# Patient Record
Sex: FEMALE | Race: OTHER
[De-identification: names, ages, dates, MRNs, and addresses within clinical notes are randomized per-mention and may not be internally consistent; named-entity substitution may affect disease eponyms.]

---

## 2019-09-16 ENCOUNTER — HOSPITAL ENCOUNTER (EMERGENCY)
Dept: HOSPITAL 84 - D.ER | Age: 24
Discharge: HOME | End: 2019-09-16
Payer: MEDICAID

## 2019-09-16 VITALS — DIASTOLIC BLOOD PRESSURE: 85 MMHG | SYSTOLIC BLOOD PRESSURE: 113 MMHG

## 2019-09-16 VITALS — WEIGHT: 156.33 LBS | HEIGHT: 60 IN | BODY MASS INDEX: 30.69 KG/M2

## 2019-09-16 DIAGNOSIS — K59.00: Primary | ICD-10-CM

## 2019-09-16 DIAGNOSIS — S39.011A: ICD-10-CM

## 2019-09-16 LAB
ALBUMIN SERPL-MCNC: 3.4 G/DL (ref 3.4–5)
ALP SERPL-CCNC: 115 U/L (ref 46–116)
ALT SERPL-CCNC: 24 U/L (ref 10–68)
ANION GAP SERPL CALC-SCNC: 10.9 MMOL/L (ref 8–16)
APPEARANCE UR: (no result)
BACTERIA #/AREA URNS HPF: (no result) /HPF
BASOPHILS NFR BLD AUTO: 0.3 % (ref 0–2)
BILIRUB SERPL-MCNC: 0.41 MG/DL (ref 0.2–1.3)
BILIRUB SERPL-MCNC: NEGATIVE MG/DL
BUN SERPL-MCNC: 11 MG/DL (ref 7–18)
CALCIUM SERPL-MCNC: 8.9 MG/DL (ref 8.5–10.1)
CHLORIDE SERPL-SCNC: 107 MMOL/L (ref 98–107)
CO2 SERPL-SCNC: 25.9 MMOL/L (ref 21–32)
COLOR UR: YELLOW
CREAT SERPL-MCNC: 0.6 MG/DL (ref 0.6–1.3)
EOSINOPHIL NFR BLD: 0.7 % (ref 0–7)
ERYTHROCYTE [DISTWIDTH] IN BLOOD BY AUTOMATED COUNT: 14.6 % (ref 11.5–14.5)
GLOBULIN SER-MCNC: 4.1 G/L
GLUCOSE SERPL-MCNC: 89 MG/DL (ref 74–106)
GLUCOSE SERPL-MCNC: NEGATIVE MG/DL
HCG UR QL: NEGATIVE
HCT VFR BLD CALC: 36.2 % (ref 36–48)
HGB BLD-MCNC: 11.8 G/DL (ref 12–16)
IMM GRANULOCYTES NFR BLD: 0.1 % (ref 0–5)
KETONES UR STRIP-MCNC: NEGATIVE MG/DL
LIPASE SERPL-CCNC: 95 U/L (ref 73–393)
LYMPHOCYTES NFR BLD AUTO: 29.7 % (ref 15–50)
MCH RBC QN AUTO: 26.4 PG (ref 26–34)
MCHC RBC AUTO-ENTMCNC: 32.6 G/DL (ref 31–37)
MCV RBC: 81 FL (ref 80–100)
MONOCYTES NFR BLD: 5.5 % (ref 2–11)
MUCOUS THREADS #/AREA URNS LPF: (no result) /LPF
NEUTROPHILS NFR BLD AUTO: 63.7 % (ref 40–80)
NITRITE UR-MCNC: NEGATIVE MG/ML
OSMOLALITY SERPL CALC.SUM OF ELEC: 276 MOSM/KG (ref 275–300)
PH UR STRIP: 5 [PH] (ref 5–6)
PLATELET # BLD: 247 10X3/UL (ref 130–400)
PMV BLD AUTO: 10.1 FL (ref 7.4–10.4)
POTASSIUM SERPL-SCNC: 3.8 MMOL/L (ref 3.5–5.1)
PROT SERPL-MCNC: 7.5 G/DL (ref 6.4–8.2)
PROT UR-MCNC: NEGATIVE MG/DL
RBC # BLD AUTO: 4.47 10X6/UL (ref 4–5.4)
RBC #/AREA URNS HPF: (no result) /HPF (ref 0–5)
SODIUM SERPL-SCNC: 140 MMOL/L (ref 136–145)
SP GR UR STRIP: 1.01 (ref 1–1.02)
SQUAMOUS #/AREA URNS HPF: (no result) /HPF (ref 0–5)
TROPONIN I SERPL-MCNC: < 0.017 NG/ML (ref 0–0.06)
UROBILINOGEN UR-MCNC: NORMAL MG/DL
WBC # BLD AUTO: 7.1 10X3/UL (ref 4.8–10.8)
WBC #/AREA URNS HPF: (no result) /HPF (ref 0–5)

## 2019-11-06 ENCOUNTER — HOSPITAL ENCOUNTER (EMERGENCY)
Dept: HOSPITAL 84 - D.ER | Age: 24
Discharge: LEFT BEFORE DISCHARGE | End: 2019-11-06
Payer: MEDICAID

## 2019-11-06 VITALS — DIASTOLIC BLOOD PRESSURE: 89 MMHG | SYSTOLIC BLOOD PRESSURE: 129 MMHG

## 2019-11-06 VITALS — HEIGHT: 60 IN | BODY MASS INDEX: 32.07 KG/M2 | WEIGHT: 163.34 LBS

## 2019-11-06 DIAGNOSIS — R10.9: Primary | ICD-10-CM

## 2019-11-06 LAB
ALBUMIN SERPL-MCNC: 3.1 G/DL (ref 3.4–5)
ALP SERPL-CCNC: 101 U/L (ref 46–116)
ALT SERPL-CCNC: 22 U/L (ref 10–68)
AMYLASE SERPL-CCNC: 66 U/L (ref 25–115)
ANION GAP SERPL CALC-SCNC: 11.4 MMOL/L (ref 8–16)
APPEARANCE UR: CLEAR
BASOPHILS NFR BLD AUTO: 0.1 % (ref 0–2)
BILIRUB SERPL-MCNC: 0.24 MG/DL (ref 0.2–1.3)
BILIRUB SERPL-MCNC: NEGATIVE MG/DL
BUN SERPL-MCNC: 8 MG/DL (ref 7–18)
CALCIUM SERPL-MCNC: 8.6 MG/DL (ref 8.5–10.1)
CHLORIDE SERPL-SCNC: 105 MMOL/L (ref 98–107)
CO2 SERPL-SCNC: 26.2 MMOL/L (ref 21–32)
COLOR UR: YELLOW
CREAT SERPL-MCNC: 0.6 MG/DL (ref 0.6–1.3)
EOSINOPHIL NFR BLD: 0.7 % (ref 0–7)
ERYTHROCYTE [DISTWIDTH] IN BLOOD BY AUTOMATED COUNT: 15.6 % (ref 11.5–14.5)
GLOBULIN SER-MCNC: 4.4 G/L
GLUCOSE SERPL-MCNC: 84 MG/DL (ref 74–106)
GLUCOSE SERPL-MCNC: NEGATIVE MG/DL
HCG SERPL-ACNC: 2947 MIU/ML
HCG UR QL: POSITIVE
HCT VFR BLD CALC: 36.1 % (ref 36–48)
HGB BLD-MCNC: 11.5 G/DL (ref 12–16)
IMM GRANULOCYTES NFR BLD: 0.2 % (ref 0–5)
KETONES UR STRIP-MCNC: NEGATIVE MG/DL
LIPASE SERPL-CCNC: 150 U/L (ref 73–393)
LYMPHOCYTES NFR BLD AUTO: 28 % (ref 15–50)
MCH RBC QN AUTO: 27.1 PG (ref 26–34)
MCHC RBC AUTO-ENTMCNC: 31.9 G/DL (ref 31–37)
MCV RBC: 84.9 FL (ref 80–100)
MONOCYTES NFR BLD: 8.6 % (ref 2–11)
NEUTROPHILS NFR BLD AUTO: 62.4 % (ref 40–80)
NITRITE UR-MCNC: NEGATIVE MG/ML
OSMOLALITY SERPL CALC.SUM OF ELEC: 274 MOSM/KG (ref 275–300)
PH UR STRIP: 7 [PH] (ref 5–6)
PLATELET # BLD: 283 10X3/UL (ref 130–400)
PMV BLD AUTO: 9.7 FL (ref 7.4–10.4)
POTASSIUM SERPL-SCNC: 3.6 MMOL/L (ref 3.5–5.1)
PROT SERPL-MCNC: 7.5 G/DL (ref 6.4–8.2)
PROT UR-MCNC: NEGATIVE MG/DL
RBC # BLD AUTO: 4.25 10X6/UL (ref 4–5.4)
SODIUM SERPL-SCNC: 139 MMOL/L (ref 136–145)
SP GR UR STRIP: 1.01 (ref 1–1.02)
UROBILINOGEN UR-MCNC: NORMAL MG/DL
WBC # BLD AUTO: 8.7 10X3/UL (ref 4.8–10.8)

## 2019-11-07 ENCOUNTER — HOSPITAL ENCOUNTER (EMERGENCY)
Dept: HOSPITAL 84 - D.ER | Age: 24
Discharge: HOME | End: 2019-11-07
Payer: MEDICAID

## 2019-11-07 VITALS — DIASTOLIC BLOOD PRESSURE: 76 MMHG | SYSTOLIC BLOOD PRESSURE: 110 MMHG

## 2019-11-07 VITALS — HEIGHT: 60 IN | WEIGHT: 163.34 LBS | BODY MASS INDEX: 32.07 KG/M2

## 2019-11-07 DIAGNOSIS — O26.90: Primary | ICD-10-CM

## 2019-11-16 ENCOUNTER — HOSPITAL ENCOUNTER (EMERGENCY)
Dept: HOSPITAL 84 - D.ER | Age: 24
Discharge: HOME | End: 2019-11-16
Payer: MEDICAID

## 2019-11-16 VITALS — WEIGHT: 160.34 LBS | HEIGHT: 60 IN | BODY MASS INDEX: 31.48 KG/M2

## 2019-11-16 VITALS — DIASTOLIC BLOOD PRESSURE: 82 MMHG | SYSTOLIC BLOOD PRESSURE: 132 MMHG

## 2019-11-16 DIAGNOSIS — O21.9: ICD-10-CM

## 2019-11-16 DIAGNOSIS — Z3A.01: ICD-10-CM

## 2019-11-16 DIAGNOSIS — O23.41: Primary | ICD-10-CM

## 2019-11-16 DIAGNOSIS — N39.0: ICD-10-CM

## 2019-11-16 LAB
ALBUMIN SERPL-MCNC: 3.2 G/DL (ref 3.4–5)
ALP SERPL-CCNC: 99 U/L (ref 46–116)
ALT SERPL-CCNC: 16 U/L (ref 10–68)
AMPHETAMINES UR QL SCN: NEGATIVE QUAL
ANION GAP SERPL CALC-SCNC: 14.5 MMOL/L (ref 8–16)
APPEARANCE UR: CLEAR
BACTERIA #/AREA URNS HPF: (no result) /HPF
BARBITURATES UR QL SCN: NEGATIVE QUAL
BASOPHILS NFR BLD AUTO: 0.3 % (ref 0–2)
BENZODIAZ UR QL SCN: NEGATIVE QUAL
BILIRUB SERPL-MCNC: 0.45 MG/DL (ref 0.2–1.3)
BILIRUB SERPL-MCNC: NEGATIVE MG/DL
BUN SERPL-MCNC: 9 MG/DL (ref 7–18)
BZE UR QL SCN: NEGATIVE QUAL
CALCIUM SERPL-MCNC: 8.9 MG/DL (ref 8.5–10.1)
CANNABINOIDS UR QL SCN: NEGATIVE QUAL
CHLORIDE SERPL-SCNC: 105 MMOL/L (ref 98–107)
CO2 SERPL-SCNC: 24.1 MMOL/L (ref 21–32)
COLOR UR: YELLOW
CREAT SERPL-MCNC: 0.7 MG/DL (ref 0.6–1.3)
EOSINOPHIL NFR BLD: 0.8 % (ref 0–7)
ERYTHROCYTE [DISTWIDTH] IN BLOOD BY AUTOMATED COUNT: 15.1 % (ref 11.5–14.5)
GLOBULIN SER-MCNC: 4.6 G/L
GLUCOSE SERPL-MCNC: 87 MG/DL (ref 74–106)
GLUCOSE SERPL-MCNC: NEGATIVE MG/DL
HCG SERPL-ACNC: (no result) MIU/ML
HCT VFR BLD CALC: 37.9 % (ref 36–48)
HGB BLD-MCNC: 12 G/DL (ref 12–16)
IMM GRANULOCYTES NFR BLD: 0.3 % (ref 0–5)
KETONES UR STRIP-MCNC: (no result) MG/DL
LYMPHOCYTES NFR BLD AUTO: 21.2 % (ref 15–50)
MCH RBC QN AUTO: 27.1 PG (ref 26–34)
MCHC RBC AUTO-ENTMCNC: 31.7 G/DL (ref 31–37)
MCV RBC: 85.7 FL (ref 80–100)
MONOCYTES NFR BLD: 6.8 % (ref 2–11)
MUCOUS THREADS #/AREA URNS LPF: (no result) /LPF
NEUTROPHILS NFR BLD AUTO: 70.6 % (ref 40–80)
NITRITE UR-MCNC: POSITIVE MG/ML
OPIATES UR QL SCN: NEGATIVE QUAL
OSMOLALITY SERPL CALC.SUM OF ELEC: 276 MOSM/KG (ref 275–300)
PCP UR QL SCN: NEGATIVE QUAL
PH UR STRIP: 6 [PH] (ref 5–6)
PLATELET # BLD: 245 10X3/UL (ref 130–400)
PMV BLD AUTO: 9.9 FL (ref 7.4–10.4)
POTASSIUM SERPL-SCNC: 3.6 MMOL/L (ref 3.5–5.1)
PROT SERPL-MCNC: 7.8 G/DL (ref 6.4–8.2)
PROT UR-MCNC: NEGATIVE MG/DL
RBC # BLD AUTO: 4.42 10X6/UL (ref 4–5.4)
RBC #/AREA URNS HPF: (no result) /HPF (ref 0–5)
SODIUM SERPL-SCNC: 140 MMOL/L (ref 136–145)
SP GR UR STRIP: 1.01 (ref 1–1.02)
UROBILINOGEN UR-MCNC: NORMAL MG/DL
WBC # BLD AUTO: 7.8 10X3/UL (ref 4.8–10.8)

## 2019-11-18 ENCOUNTER — HOSPITAL ENCOUNTER (OUTPATIENT)
Dept: HOSPITAL 84 - D.LDO | Age: 24
LOS: 1 days | Discharge: HOME | End: 2019-11-19
Attending: OBSTETRICS & GYNECOLOGY
Payer: MEDICAID

## 2019-11-18 VITALS — SYSTOLIC BLOOD PRESSURE: 124 MMHG | DIASTOLIC BLOOD PRESSURE: 68 MMHG

## 2019-11-18 VITALS — BODY MASS INDEX: 31.3 KG/M2

## 2019-11-18 DIAGNOSIS — Z3A.08: ICD-10-CM

## 2019-11-18 DIAGNOSIS — R11.2: ICD-10-CM

## 2019-11-18 DIAGNOSIS — M54.9: ICD-10-CM

## 2019-11-18 DIAGNOSIS — O26.891: Primary | ICD-10-CM

## 2019-11-18 LAB
ALBUMIN SERPL-MCNC: 3.4 G/DL (ref 3.4–5)
ALP SERPL-CCNC: 99 U/L (ref 46–116)
ALT SERPL-CCNC: 16 U/L (ref 10–68)
ANION GAP SERPL CALC-SCNC: 15.4 MMOL/L (ref 8–16)
APPEARANCE UR: CLEAR
BASOPHILS NFR BLD AUTO: 0.4 % (ref 0–2)
BILIRUB SERPL-MCNC: 0.39 MG/DL (ref 0.2–1.3)
BILIRUB SERPL-MCNC: NEGATIVE MG/DL
BUN SERPL-MCNC: 7 MG/DL (ref 7–18)
CALCIUM SERPL-MCNC: 9.1 MG/DL (ref 8.5–10.1)
CHLORIDE SERPL-SCNC: 104 MMOL/L (ref 98–107)
CO2 SERPL-SCNC: 22.2 MMOL/L (ref 21–32)
COLOR UR: YELLOW
CREAT SERPL-MCNC: 0.6 MG/DL (ref 0.6–1.3)
EOSINOPHIL NFR BLD: 0.4 % (ref 0–7)
ERYTHROCYTE [DISTWIDTH] IN BLOOD BY AUTOMATED COUNT: 15 % (ref 11.5–14.5)
GLOBULIN SER-MCNC: 4.1 G/L
GLUCOSE SERPL-MCNC: 81 MG/DL (ref 74–106)
GLUCOSE SERPL-MCNC: NEGATIVE MG/DL
HCT VFR BLD CALC: 37.9 % (ref 36–48)
HGB BLD-MCNC: 12.2 G/DL (ref 12–16)
IMM GRANULOCYTES NFR BLD: 0.1 % (ref 0–5)
KETONES UR STRIP-MCNC: (no result) MG/DL
LYMPHOCYTES NFR BLD AUTO: 22.3 % (ref 15–50)
MCH RBC QN AUTO: 27.3 PG (ref 26–34)
MCHC RBC AUTO-ENTMCNC: 32.2 G/DL (ref 31–37)
MCV RBC: 84.8 FL (ref 80–100)
MONOCYTES NFR BLD: 8.1 % (ref 2–11)
NEUTROPHILS NFR BLD AUTO: 68.7 % (ref 40–80)
NITRITE UR-MCNC: NEGATIVE MG/ML
OSMOLALITY SERPL CALC.SUM OF ELEC: 272 MOSM/KG (ref 275–300)
PH UR STRIP: 5 [PH] (ref 5–6)
PLATELET # BLD: 256 10X3/UL (ref 130–400)
PMV BLD AUTO: 10.2 FL (ref 7.4–10.4)
POTASSIUM SERPL-SCNC: 3.6 MMOL/L (ref 3.5–5.1)
PROT SERPL-MCNC: 7.5 G/DL (ref 6.4–8.2)
PROT UR-MCNC: NEGATIVE MG/DL
RBC # BLD AUTO: 4.47 10X6/UL (ref 4–5.4)
SODIUM SERPL-SCNC: 138 MMOL/L (ref 136–145)
SP GR UR STRIP: 1.01 (ref 1–1.02)
UROBILINOGEN UR-MCNC: 1 MG/DL
WBC # BLD AUTO: 7.2 10X3/UL (ref 4.8–10.8)

## 2019-11-18 NOTE — NUR
PT TRANSFERRED FROM LABOR AND DELIVERY BY WHEELCHAIR. BEDSIDE REPORT RECEIVED
FROM GISELL LARSEN RN.  PT IS AWAKE AND ALERT AT THIS TIME,  SHE RATES HER PAIN AT
0/10 AND DENIES NAUSEA.  IV INFUSING TO RIGHT HAND PER ORDERS.  PT IS NPO PER
DR HENRY ORDERS AND STATES HER UNDERSTANDING AS TO WHY THIS IS.  CALL LIGHT
IN REACH WITH SIDE RAILS UP X 2.

## 2019-11-18 NOTE — NUR
PT RESTING WITH HER EYES CLOSED AT THIS TIME.  SHE DOES WAKE UP TO TALK.
REGLAN IV GIVEN AS WELL AS D5 1/2 NS AT 125CC/HR.  EXPLAINED TO PT THAT I
WOULD BE BACK A LITTLE LATER TO DO HER ASSESSMENT.

## 2019-11-18 NOTE — NUR
PT CALLS OUT WITH REQUEST FOR PHENERGAN,  STATES THAT SHE IS VERY NAUSEATED AT
THIS TIME.  PHENERGAN 25MG DILUTED IN 9ML NS GIVEN SLOW IV PUSH OVER
5MINUTES, FLUSHED EASILY AFTERWARDS WITH 5ML NS.  LIGHTS TURNED DOWN PER PT
REQUEST.

## 2019-11-18 NOTE — NUR
PT IS RESTING WITH HER EYES CLOSED IN HER DARK ROOM.  SHE IS VERY DROWSEY. SHE
WILL ANSWER QUESTIONS BUT REALLY DOESN'T WANT TO BE BOTHERED.  HEART SOUNDS
GOOD, LUNGS CLEAR, BOWEL SOUNDS HEARD. PT GETS UP TO GO TO THE BR
INDEPENDENTLY.  SHE HAS NOT VOMITED THIS SHIFT.  SHE STATES SHE CANNOT
REMEMBER THE LAST TIME SHE VOMITED.  SHE CONT. TO BE NPO.  NO C/O NAUSEA AT
THIS TIME.  REGLAN WAS GIVEN IV AT 1930. FOLLOWING.

## 2019-11-19 VITALS — SYSTOLIC BLOOD PRESSURE: 90 MMHG | DIASTOLIC BLOOD PRESSURE: 46 MMHG

## 2019-11-19 VITALS — SYSTOLIC BLOOD PRESSURE: 120 MMHG | DIASTOLIC BLOOD PRESSURE: 64 MMHG

## 2019-11-19 VITALS — DIASTOLIC BLOOD PRESSURE: 73 MMHG | SYSTOLIC BLOOD PRESSURE: 103 MMHG

## 2019-11-19 NOTE — NUR
PT RESTING WITH EYES CLOSED, AROUSES TO SOFT VERBAL STIMULATION, VS OBTAINED,
ADM REGLAN SIVP PER MD ORDERS, SEE EMAR, PT DENIES ANY NAUSEA/VOMITING OR PAIN
AT THIS TIME, PT STATES "I'M READY TO GO HOME", PT DENIES ANY NEEDS

## 2019-11-19 NOTE — NUR
AM ASSESSMENT COMPLETE, SEE FLOWSHEET. VS OBTAINED AND STABLE, SEE FLOWSHEET.
PATIENT RATES PAIN AT 0 OUT OF 10 AND DENIES ANY NAUSEA OR VOMITING. 400CC
CLEAR YELLOW URINE NOTED IN HAT AND EMPTIED. PATIENT DENIES ANY ISSUES WITH
VOIDING. RIGHT HAND IV PATENT AND INFUSING. PT DENIES PAIN AT IV SITE.
BREAKFAST TRAY NOTED AT BEDSIDE. PT DENIES ANY NEEDS AT THIS TIME. SRUP X2,
CALL LIGHT AND PHONE WITHIN REACH.

## 2019-11-19 NOTE — NUR
IV CATH REMOVED -CATH TIP INTACT. PRESSURE HELD AND BANDAIDE APPLIED. SCRIPT
FOR REGLAN 10MG PO [id number] CALLED INTO [facility] ON [address].

## 2019-11-19 NOTE — NUR
ROUNDS COMPLETED. DENIES NAUSEA AND VOMITING AND PAIN. FRESH ICE WATER
PROVIDED. SRUPX2. CALL LIGHT AND PHONE WITHIN REACH.

## 2019-11-19 NOTE — NUR
DISCHARGE INST VERBAL AND WRITTEN GIVEN. SEE SIGNED INST SHEET. PT STATES SHE
WILL WALK OFF UNIT WITH FAMILY- DENIES WANTING WHEELCHAIR. STATES THAT SHE
WILL FINISH HER SUPPER AND THEN BE READY TO LEAVE UNIT.

## 2019-11-19 NOTE — NUR
ROUNDS COMPLETED. PT RESTING WITH EYES CLOSED. RESPIRATIONS EVEN AND
UNLABORED. SRUPX2 CALL LIGHT AND PHONE WITHIN REACH.

## 2019-11-19 NOTE — NUR
PT STILL RESTING WITH HER EYES CLOSED RESTING ON HER ABD.  SHE WILL WAKE UP
WHEN ASKED QUESTIONS.  SHE HAS NOT VOMITED SINCE THE BEGINNING OF THIS SHIFT
THAT RN HAS SEEN AND PT HAS NOT STATES SHE HAS VOMITED.  PT HAS NO NEEDS OR
WANTS AT THIS TIME.

## 2019-11-19 NOTE — NUR
REPORT OF PT EATING AND NO EMESIS AND THAT SHE IS READY TO GO HOME TO DR HENRY. STATES SHE WANTS TO COME AND SEE HER BEFORE DISCHARGE. INFORMED PT OF
THIS.

## 2019-11-26 ENCOUNTER — HOSPITAL ENCOUNTER (OUTPATIENT)
Dept: HOSPITAL 84 - D.LDO | Age: 24
Discharge: LEFT BEFORE DISCHARGE | End: 2019-11-26
Attending: OBSTETRICS & GYNECOLOGY
Payer: MEDICAID

## 2019-11-26 VITALS — BODY MASS INDEX: 31.3 KG/M2

## 2019-11-26 DIAGNOSIS — F41.0: ICD-10-CM

## 2019-11-26 DIAGNOSIS — O26.899: Primary | ICD-10-CM

## 2019-11-26 LAB
ALBUMIN SERPL-MCNC: 3.5 G/DL (ref 3.4–5)
ALP SERPL-CCNC: 104 U/L (ref 46–116)
ALT SERPL-CCNC: 18 U/L (ref 10–68)
ANION GAP SERPL CALC-SCNC: 14.4 MMOL/L (ref 8–16)
APPEARANCE UR: (no result)
BACTERIA #/AREA URNS HPF: (no result) /HPF
BASOPHILS NFR BLD AUTO: 0.2 % (ref 0–2)
BILIRUB SERPL-MCNC: 0.46 MG/DL (ref 0.2–1.3)
BILIRUB SERPL-MCNC: NEGATIVE MG/DL
BUN SERPL-MCNC: 9 MG/DL (ref 7–18)
CALCIUM SERPL-MCNC: 9.5 MG/DL (ref 8.5–10.1)
CHLORIDE SERPL-SCNC: 104 MMOL/L (ref 98–107)
CO2 SERPL-SCNC: 23.4 MMOL/L (ref 21–32)
COLOR UR: YELLOW
CREAT SERPL-MCNC: 0.6 MG/DL (ref 0.6–1.3)
EOSINOPHIL NFR BLD: 0.5 % (ref 0–7)
ERYTHROCYTE [DISTWIDTH] IN BLOOD BY AUTOMATED COUNT: 14.9 % (ref 11.5–14.5)
GLOBULIN SER-MCNC: 4.4 G/L
GLUCOSE SERPL-MCNC: 81 MG/DL (ref 74–106)
GLUCOSE SERPL-MCNC: NEGATIVE MG/DL
HCT VFR BLD CALC: 38.2 % (ref 36–48)
HGB BLD-MCNC: 12.3 G/DL (ref 12–16)
IMM GRANULOCYTES NFR BLD: 0.2 % (ref 0–5)
KETONES UR STRIP-MCNC: (no result) MG/DL
LYMPHOCYTES NFR BLD AUTO: 23 % (ref 15–50)
MCH RBC QN AUTO: 27.3 PG (ref 26–34)
MCHC RBC AUTO-ENTMCNC: 32.2 G/DL (ref 31–37)
MCV RBC: 84.7 FL (ref 80–100)
MONOCYTES NFR BLD: 5.2 % (ref 2–11)
MUCOUS THREADS #/AREA URNS LPF: (no result) /LPF
NEUTROPHILS NFR BLD AUTO: 70.9 % (ref 40–80)
NITRITE UR-MCNC: NEGATIVE MG/ML
OSMOLALITY SERPL CALC.SUM OF ELEC: 273 MOSM/KG (ref 275–300)
PH UR STRIP: 5 [PH] (ref 5–6)
PLATELET # BLD: 296 10X3/UL (ref 130–400)
PMV BLD AUTO: 10.3 FL (ref 7.4–10.4)
POTASSIUM SERPL-SCNC: 3.8 MMOL/L (ref 3.5–5.1)
PROT SERPL-MCNC: 7.9 G/DL (ref 6.4–8.2)
PROT UR-MCNC: (no result) MG/DL
RBC # BLD AUTO: 4.51 10X6/UL (ref 4–5.4)
RBC #/AREA URNS HPF: (no result) /HPF (ref 0–5)
SODIUM SERPL-SCNC: 138 MMOL/L (ref 136–145)
SP GR UR STRIP: 1.01 (ref 1–1.02)
SQUAMOUS #/AREA URNS HPF: (no result) /HPF (ref 0–5)
UROBILINOGEN UR-MCNC: 4 MG/DL
WBC # BLD AUTO: 9.3 10X3/UL (ref 4.8–10.8)
WBC #/AREA URNS HPF: (no result) /HPF

## 2019-11-26 NOTE — NUR
ROUNDED ON ROOMON THIS OUTPATIENT.  PT IS RESTING IN BED WATCHING TV.  HER VS
WERE 98.1, 106/63, 87,17, 99% O2.  HEART SOUNDS CLEAR, LUNGS CLEAR, BOWEL
SOUNDS IN ALL QUADS.  PT IS UP AD ROBERT IN HER ROOM.  PT STATES SHE HAD A BM
TODAY.  IV IS INFUSING A BANANA BAG AT THIS TIME.  PT STATES SHE HAS NO
NAUSEA, NO VOMITING, AND NO ABD PAIN AT THIS TIME.  SHE REFUSED HER PHENERGAN
THAT WAS SCHEDULED AT THIS TIME.  SHE DID TAKE HER REGLAN IV.

## 2020-01-23 ENCOUNTER — HOSPITAL ENCOUNTER (EMERGENCY)
Dept: HOSPITAL 84 - D.ER | Age: 25
Discharge: HOME | End: 2020-01-23
Payer: MEDICAID

## 2020-01-23 VITALS — SYSTOLIC BLOOD PRESSURE: 116 MMHG | DIASTOLIC BLOOD PRESSURE: 72 MMHG

## 2020-01-23 VITALS
WEIGHT: 143.3 LBS | HEIGHT: 60 IN | HEIGHT: 60 IN | BODY MASS INDEX: 28.13 KG/M2 | WEIGHT: 143.3 LBS | BODY MASS INDEX: 28.13 KG/M2

## 2020-01-23 DIAGNOSIS — R10.2: ICD-10-CM

## 2020-01-23 DIAGNOSIS — Z3A.16: ICD-10-CM

## 2020-01-23 DIAGNOSIS — O26.892: Primary | ICD-10-CM

## 2020-01-23 LAB
APPEARANCE UR: (no result)
BACTERIA #/AREA URNS HPF: (no result) /HPF
BASOPHILS NFR BLD AUTO: 0.1 % (ref 0–2)
BILIRUB SERPL-MCNC: NEGATIVE MG/DL
COLOR UR: YELLOW
EOSINOPHIL NFR BLD: 0.1 % (ref 0–7)
ERYTHROCYTE [DISTWIDTH] IN BLOOD BY AUTOMATED COUNT: 14.6 % (ref 11.5–14.5)
GLUCOSE SERPL-MCNC: NEGATIVE MG/DL
HCT VFR BLD CALC: 38.1 % (ref 36–48)
HGB BLD-MCNC: 12.5 G/DL (ref 12–16)
IMM GRANULOCYTES NFR BLD: 0.2 % (ref 0–5)
KETONES UR STRIP-MCNC: (no result) MG/DL
LYMPHOCYTES NFR BLD AUTO: 14.2 % (ref 15–50)
MCH RBC QN AUTO: 27.7 PG (ref 26–34)
MCHC RBC AUTO-ENTMCNC: 32.8 G/DL (ref 31–37)
MCV RBC: 84.3 FL (ref 80–100)
MONOCYTES NFR BLD: 4 % (ref 2–11)
NEUTROPHILS NFR BLD AUTO: 81.4 % (ref 40–80)
NITRITE UR-MCNC: NEGATIVE MG/ML
PH UR STRIP: 6 [PH] (ref 5–6)
PLATELET # BLD: 254 10X3/UL (ref 130–400)
PMV BLD AUTO: 10.1 FL (ref 7.4–10.4)
PROT UR-MCNC: NEGATIVE MG/DL
RBC # BLD AUTO: 4.52 10X6/UL (ref 4–5.4)
RBC #/AREA URNS HPF: (no result) /HPF (ref 0–5)
SP GR UR STRIP: 1.01 (ref 1–1.02)
SQUAMOUS #/AREA URNS HPF: >50 /HPF (ref 0–5)
UROBILINOGEN UR-MCNC: 4 MG/DL
WBC # BLD AUTO: 8.8 10X3/UL (ref 4.8–10.8)
WBC #/AREA URNS HPF: (no result) /HPF

## 2020-02-22 ENCOUNTER — HOSPITAL ENCOUNTER (OUTPATIENT)
Dept: HOSPITAL 84 - D.LDO | Age: 25
Discharge: HOME | End: 2020-02-22
Attending: OBSTETRICS & GYNECOLOGY
Payer: MEDICAID

## 2020-02-22 VITALS — BODY MASS INDEX: 27.9 KG/M2

## 2020-02-22 DIAGNOSIS — O26.892: Primary | ICD-10-CM

## 2020-02-22 DIAGNOSIS — R10.9: ICD-10-CM

## 2020-02-22 DIAGNOSIS — Z3A.19: ICD-10-CM

## 2020-02-22 LAB
APPEARANCE UR: CLEAR
BILIRUB SERPL-MCNC: NEGATIVE MG/DL
COLOR UR: YELLOW
GLUCOSE SERPL-MCNC: NEGATIVE MG/DL
KETONES UR STRIP-MCNC: NEGATIVE MG/DL
NITRITE UR-MCNC: NEGATIVE MG/ML
PH UR STRIP: 6 [PH] (ref 5–6)
PROT UR-MCNC: NEGATIVE MG/DL
SP GR UR STRIP: 1.01 (ref 1–1.02)
UROBILINOGEN UR-MCNC: NORMAL MG/DL

## 2020-03-18 ENCOUNTER — HOSPITAL ENCOUNTER (EMERGENCY)
Dept: HOSPITAL 84 - D.ER | Age: 25
Discharge: HOME | End: 2020-03-18
Payer: MEDICAID

## 2020-03-18 VITALS
BODY MASS INDEX: 28.73 KG/M2 | WEIGHT: 146.31 LBS | BODY MASS INDEX: 28.73 KG/M2 | WEIGHT: 146.31 LBS | HEIGHT: 60 IN | HEIGHT: 60 IN

## 2020-03-18 VITALS — DIASTOLIC BLOOD PRESSURE: 71 MMHG | SYSTOLIC BLOOD PRESSURE: 129 MMHG

## 2020-03-18 DIAGNOSIS — Z3A.23: ICD-10-CM

## 2020-03-18 DIAGNOSIS — R42: ICD-10-CM

## 2020-03-18 DIAGNOSIS — O21.2: Primary | ICD-10-CM

## 2020-03-18 LAB
ALBUMIN SERPL-MCNC: 3 G/DL (ref 3.4–5)
ALP SERPL-CCNC: 136 U/L (ref 30–120)
ALT SERPL-CCNC: 12 U/L (ref 10–68)
ANION GAP SERPL CALC-SCNC: 12.7 MMOL/L (ref 8–16)
BACTERIA #/AREA URNS HPF: (no result) /HPF
BASOPHILS NFR BLD AUTO: 0.2 % (ref 0–2)
BILIRUB SERPL-MCNC: 0.33 MG/DL (ref 0.2–1.3)
BILIRUB SERPL-MCNC: NEGATIVE MG/DL
BUN SERPL-MCNC: 10 MG/DL (ref 7–18)
CALCIUM SERPL-MCNC: 8.9 MG/DL (ref 8.5–10.1)
CHLORIDE SERPL-SCNC: 101 MMOL/L (ref 98–107)
CO2 SERPL-SCNC: 26.2 MMOL/L (ref 21–32)
CREAT SERPL-MCNC: 0.5 MG/DL (ref 0.6–1.3)
EOSINOPHIL NFR BLD: 0.4 % (ref 0–7)
ERYTHROCYTE [DISTWIDTH] IN BLOOD BY AUTOMATED COUNT: 14.9 % (ref 11.5–14.5)
GLOBULIN SER-MCNC: 4.6 G/L
GLUCOSE SERPL-MCNC: 69 MG/DL (ref 74–106)
GLUCOSE SERPL-MCNC: NEGATIVE MG/DL
HCT VFR BLD CALC: 35.7 % (ref 36–48)
HGB BLD-MCNC: 11.3 G/DL (ref 12–16)
IMM GRANULOCYTES NFR BLD: 0.5 % (ref 0–5)
KETONES UR STRIP-MCNC: NEGATIVE MG/DL
LYMPHOCYTES NFR BLD AUTO: 19.1 % (ref 15–50)
MCH RBC QN AUTO: 27.6 PG (ref 26–34)
MCHC RBC AUTO-ENTMCNC: 31.7 G/DL (ref 31–37)
MCV RBC: 87.1 FL (ref 80–100)
MONOCYTES NFR BLD: 6.3 % (ref 2–11)
NEUTROPHILS NFR BLD AUTO: 73.5 % (ref 40–80)
NITRITE UR-MCNC: NEGATIVE MG/ML
OSMOLALITY SERPL CALC.SUM OF ELEC: 268 MOSM/KG (ref 275–300)
PH UR STRIP: 7 [PH] (ref 5–6)
PLATELET # BLD: 283 10X3/UL (ref 130–400)
PMV BLD AUTO: 9.5 FL (ref 7.4–10.4)
POTASSIUM SERPL-SCNC: 3.9 MMOL/L (ref 3.5–5.1)
PROT SERPL-MCNC: 7.6 G/DL (ref 6.4–8.2)
RBC # BLD AUTO: 4.1 10X6/UL (ref 4–5.4)
RBC #/AREA URNS HPF: (no result) /HPF (ref 0–5)
SODIUM SERPL-SCNC: 136 MMOL/L (ref 136–145)
SP GR UR STRIP: 1.01 (ref 1–1.02)
SQUAMOUS #/AREA URNS HPF: (no result) /HPF (ref 0–5)
UROBILINOGEN UR-MCNC: 1 MG/DL
WBC # BLD AUTO: 12.2 10X3/UL (ref 4.8–10.8)
WBC #/AREA URNS HPF: (no result) /HPF

## 2020-05-04 ENCOUNTER — HOSPITAL ENCOUNTER (OUTPATIENT)
Dept: HOSPITAL 84 - D.LDO | Age: 25
Discharge: HOME | End: 2020-05-04
Attending: OBSTETRICS & GYNECOLOGY
Payer: MEDICAID

## 2020-05-04 VITALS — BODY MASS INDEX: 28.5 KG/M2

## 2020-05-04 DIAGNOSIS — O36.8190: Primary | ICD-10-CM

## 2020-05-04 DIAGNOSIS — Z3A.00: ICD-10-CM

## 2020-06-04 ENCOUNTER — HOSPITAL ENCOUNTER (OUTPATIENT)
Dept: HOSPITAL 84 - D.LD | Age: 25
Setting detail: OBSERVATION
LOS: 1 days | Discharge: HOME | End: 2020-06-05
Attending: OBSTETRICS & GYNECOLOGY | Admitting: OBSTETRICS & GYNECOLOGY
Payer: MEDICAID

## 2020-06-04 VITALS — BODY MASS INDEX: 28.5 KG/M2

## 2020-06-04 DIAGNOSIS — Z3A.34: ICD-10-CM

## 2020-06-04 DIAGNOSIS — R10.9: ICD-10-CM

## 2020-06-04 DIAGNOSIS — O26.893: Primary | ICD-10-CM

## 2020-06-23 ENCOUNTER — HOSPITAL ENCOUNTER (OUTPATIENT)
Dept: HOSPITAL 84 - D.LDO | Age: 25
Discharge: HOME | End: 2020-06-23
Attending: OBSTETRICS & GYNECOLOGY
Payer: MEDICAID

## 2020-06-23 VITALS — BODY MASS INDEX: 28.5 KG/M2

## 2020-06-23 DIAGNOSIS — O26.893: Primary | ICD-10-CM

## 2020-06-23 DIAGNOSIS — R10.30: ICD-10-CM

## 2020-06-23 DIAGNOSIS — M54.9: ICD-10-CM

## 2020-06-23 DIAGNOSIS — Z3A.37: ICD-10-CM

## 2020-06-23 LAB
BACTERIA #/AREA URNS HPF: (no result) /HPF
BILIRUB SERPL-MCNC: NEGATIVE MG/DL
GLUCOSE SERPL-MCNC: NEGATIVE MG/DL
KETONES UR STRIP-MCNC: NEGATIVE MG/DL
NITRITE UR-MCNC: NEGATIVE MG/ML
PH UR STRIP: 7 [PH] (ref 5–6)
RBC #/AREA URNS HPF: (no result) /HPF (ref 0–5)
SP GR UR STRIP: 1.01 (ref 1–1.02)
SQUAMOUS #/AREA URNS HPF: (no result) /HPF (ref 0–5)
UROBILINOGEN UR-MCNC: NORMAL MG/DL
WBC #/AREA URNS HPF: (no result) /HPF

## 2020-07-01 ENCOUNTER — HOSPITAL ENCOUNTER (OUTPATIENT)
Dept: HOSPITAL 84 - D.LDO | Age: 25
Discharge: HOME | End: 2020-07-01
Attending: OBSTETRICS & GYNECOLOGY
Payer: MEDICAID

## 2020-07-01 VITALS — BODY MASS INDEX: 28.5 KG/M2

## 2020-07-01 DIAGNOSIS — O26.893: Primary | ICD-10-CM

## 2020-07-01 DIAGNOSIS — Z3A.38: ICD-10-CM

## 2020-07-01 DIAGNOSIS — N85.8: ICD-10-CM

## 2020-07-06 ENCOUNTER — HOSPITAL ENCOUNTER (INPATIENT)
Dept: HOSPITAL 84 - D.LDO | Age: 25
LOS: 1 days | Discharge: HOME | End: 2020-07-07
Attending: OBSTETRICS & GYNECOLOGY | Admitting: OBSTETRICS & GYNECOLOGY
Payer: MEDICAID

## 2020-07-06 VITALS — SYSTOLIC BLOOD PRESSURE: 116 MMHG | DIASTOLIC BLOOD PRESSURE: 78 MMHG

## 2020-07-06 VITALS — WEIGHT: 176.37 LBS | BODY MASS INDEX: 34.63 KG/M2 | HEIGHT: 60 IN | BODY MASS INDEX: 34.63 KG/M2

## 2020-07-06 VITALS — SYSTOLIC BLOOD PRESSURE: 115 MMHG | DIASTOLIC BLOOD PRESSURE: 69 MMHG

## 2020-07-06 VITALS — SYSTOLIC BLOOD PRESSURE: 99 MMHG | DIASTOLIC BLOOD PRESSURE: 70 MMHG

## 2020-07-06 DIAGNOSIS — Z3A.39: ICD-10-CM

## 2020-07-06 LAB
AMPHETAMINES UR QL SCN: NEGATIVE QUAL
BARBITURATES UR QL SCN: NEGATIVE QUAL
BENZODIAZ UR QL SCN: NEGATIVE QUAL
BILIRUB SERPL-MCNC: NEGATIVE MG/DL
BZE UR QL SCN: NEGATIVE QUAL
CANNABINOIDS UR QL SCN: NEGATIVE QUAL
ERYTHROCYTE [DISTWIDTH] IN BLOOD BY AUTOMATED COUNT: 17.1 % (ref 11.5–14.5)
GLUCOSE SERPL-MCNC: NEGATIVE MG/DL
HCT VFR BLD CALC: 32.3 % (ref 36–48)
HGB BLD-MCNC: 9.5 G/DL (ref 12–16)
KETONES UR STRIP-MCNC: NEGATIVE MG/DL
MCH RBC QN AUTO: 22.9 PG (ref 26–34)
MCHC RBC AUTO-ENTMCNC: 29.4 G/DL (ref 31–37)
MCV RBC: 77.8 FL (ref 80–100)
NITRITE UR-MCNC: NEGATIVE MG/ML
OPIATES UR QL SCN: NEGATIVE QUAL
PCP UR QL SCN: NEGATIVE QUAL
PH UR STRIP: 7 [PH] (ref 5–6)
PLATELET # BLD: 278 10X3/UL (ref 130–400)
PMV BLD AUTO: 10.2 FL (ref 7.4–10.4)
RBC # BLD AUTO: 4.15 10X6/UL (ref 4–5.4)
SP GR UR STRIP: 1.01 (ref 1–1.02)
UROBILINOGEN UR-MCNC: NORMAL MG/DL
WBC # BLD AUTO: 13.8 10X3/UL (ref 4.8–10.8)

## 2020-07-06 NOTE — NUR
PT SITTING UP IN BED. CONSUMING FOOD BROUGHT IN BY FAMILY. STATES HEADACHE
RELIEVED BY TORADOL. STATES SHOWERED AND VOIDED. DENIES HEAVY BLEEDING/SOAKING
PAD IN AN HOUR. STATES PASSED ONE SMALL CLOT. DENIES NEEDS OR C/O.

## 2020-07-06 NOTE — NUR
PT LYING SUPINE IN BED. EYES CLOSED. RESP NON-LABORED. PT NOT DISTURBED TO
ALLOW FOR REST. SR UPX  2. CALL LIGHT IN REACH.

## 2020-07-06 NOTE — NUR
ASSESSMENT PER FLOW SHEET, VS OBTAINED, SALINE LOCK TO LEFT AC INTACT WITH NO
REDNESS OR EDEMA, FF, ML, U/1, PT REPORTS LITE BLEEDING WITH NO CLOTS, PT
REPORTS FLATUS, NO BM AND VOIDING WITH NO DIFFICULTY, PT REPORTS ALREADY
TAKING A SHOWER, DENIES PAIN, REQUESTED AND SERVED LEMON LIME SODA AND CUP OF
ICE, DENIES FURTHER NEEDS

## 2020-07-06 NOTE — NUR
PT C/O ABDOMINAL CRAMPING OF "8" ON 0-10 PAIN SCALE. TYLENOL 1000 MG GIVEN PO
AS ORDERED. PT ALSO VOIDED 450 ML OF BLOOD-TINGED URINE IN SPECIPAN-VOID
NUMBER 3. PT DENIES HEAVY BLEEDING OR PASSING CLOTS.

## 2020-07-06 NOTE — NUR
PT ON CALL LIGHT, REPORTS PASSING A CLOT, CLOT NOTED TO BE ABOUT QUARTER SIZE
IN THE COMMODE, PT BACK IN BED, FF, ML, U/1, LITE BLEEDING NOTED, PT DENIES
FURTHER NEEDS, FOB FEEDING INFANT AT THIS TIME

## 2020-07-06 NOTE — NUR
PT C/O H/A OF "8" ON 0-10 PAIN SCALE. TORADOL 10 MG GIVEN PO AS ORDERED.
INSTRUCTED ON MED. VERBALIZES UNDERSTANDING.

## 2020-07-06 NOTE — NUR
PT AROUSES TO OPENING OF DOOR, C/O CRAMPING, ADM TORADOL PO PER MD ORDERS, SEE
EMAR, PT DENIES FURTHER NEEDS, INFANT IN OPEN CRIB CART AND FOB AT BEDSIDE

## 2020-07-07 LAB
BASOPHILS NFR BLD AUTO: 0.2 % (ref 0–2)
EOSINOPHIL NFR BLD: 0.3 % (ref 0–7)
ERYTHROCYTE [DISTWIDTH] IN BLOOD BY AUTOMATED COUNT: 17.3 % (ref 11.5–14.5)
HCT VFR BLD CALC: 29.3 % (ref 36–48)
HGB BLD-MCNC: 8.5 G/DL (ref 12–16)
IMM GRANULOCYTES NFR BLD: 0.5 % (ref 0–5)
LYMPHOCYTES NFR BLD AUTO: 20.8 % (ref 15–50)
MCH RBC QN AUTO: 22.4 PG (ref 26–34)
MCHC RBC AUTO-ENTMCNC: 29 G/DL (ref 31–37)
MCV RBC: 77.3 FL (ref 80–100)
MONOCYTES NFR BLD: 6.7 % (ref 2–11)
NEUTROPHILS NFR BLD AUTO: 71.5 % (ref 40–80)
PLATELET # BLD: 218 10X3/UL (ref 130–400)
PMV BLD AUTO: 9.7 FL (ref 7.4–10.4)
RBC # BLD AUTO: 3.79 10X6/UL (ref 4–5.4)
WBC # BLD AUTO: 12 10X3/UL (ref 4.8–10.8)

## 2020-07-07 NOTE — NUR
TORDAL GIVEN FOR COMPLAINT OF PAIN THAT SHE RATES AT 6/10.  SALINE LOCK
REMOVED INTACT AT THIS TIME ALSO. BONDING WITH INFANT, DENIES ANY OTHER NEEDS
AT THIS TIME.

## 2020-07-07 NOTE — NUR
VERBAL AND WRITTEN DISCHARGE ORDERS GONE OVER. STATES UNDERSTANDING OF PAIN
CONTROL AFTER DISCHARGE.  DENIES QUESTIONS OR CONCERNS. NURSERY NOTIFIED
INFANT IS BEING SECURED INTO CARRIER.

## 2020-07-07 NOTE — NUR
AM ASSESSMENT COMPLETED AS CHARTED TO FLOWSHEET. PT DENIES PAIN OR DISCOMFORT
AT THIS TIME, SHE IS SITTING UP IN BED EATING REGULAR DIET, INFANT IN ROOM
WITH FOB ALSO PRESENT. ADDITIONAL ANT PADS AND MESH BRIEFS PLACED IN BATHROOM
AS REQUESTED. CALL LIGHT IS WITHIN REACH.

## 2020-07-07 NOTE — NUR
PT RESTING WITH EYES CLOSED, RESP QUIET, NO DISTRESS NOTED, LEFT UNDISTURBED
AT THIS TIME, FOB HOLDING INFANT

## 2020-07-07 NOTE — NUR
PT HOLDING INFANT, PT INST NOT TO SLEEP WITH INFANT IN BED WITH HER, PT
VERBALIZES UNDERSTANDING, DENIES NEEDS OR PAIN AT THIS TIME

## 2020-07-07 NOTE — NUR
PT AWAKE, C/O CRAMPING, ADM TYLENOL PO PER MD ORDERS, SEE EMAR, WITH FRESH H2,
PT DENIES FURTHER NEEDS, FOB ASLEEP ON COUCH